# Patient Record
Sex: FEMALE | Race: WHITE | HISPANIC OR LATINO | Employment: STUDENT | URBAN - METROPOLITAN AREA
[De-identification: names, ages, dates, MRNs, and addresses within clinical notes are randomized per-mention and may not be internally consistent; named-entity substitution may affect disease eponyms.]

---
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
## 2024-06-23 ENCOUNTER — NURSE TRIAGE (OUTPATIENT)
Dept: ADMINISTRATIVE | Facility: CLINIC | Age: 19
End: 2024-06-23
Payer: MEDICAID

## 2024-06-23 ENCOUNTER — HOSPITAL ENCOUNTER (EMERGENCY)
Facility: HOSPITAL | Age: 19
Discharge: HOME | End: 2024-06-23
Attending: EMERGENCY MEDICINE
Payer: MEDICAID

## 2024-06-23 VITALS
WEIGHT: 116.38 LBS | BODY MASS INDEX: 19.37 KG/M2 | RESPIRATION RATE: 16 BRPM | HEART RATE: 101 BPM | DIASTOLIC BLOOD PRESSURE: 87 MMHG | TEMPERATURE: 98 F | SYSTOLIC BLOOD PRESSURE: 139 MMHG | OXYGEN SATURATION: 98 %

## 2024-06-23 DIAGNOSIS — L25.9 CONTACT DERMATITIS, UNSPECIFIED CONTACT DERMATITIS TYPE, UNSPECIFIED TRIGGER: Primary | ICD-10-CM

## 2024-06-23 PROCEDURE — 99281 EMR DPT VST MAYX REQ PHY/QHP: CPT

## 2024-06-23 NOTE — Clinical Note
"Torri Harrissiobhan Dunn was seen and treated in our emergency department on 6/23/2024.  She may return to school on 06/25/2024.      If you have any questions or concerns, please don't hesitate to call.      Hugo Hoff, NP"

## 2024-06-23 NOTE — ED PROVIDER NOTES
Encounter Date: 6/23/2024       History     Chief Complaint   Patient presents with    Breast Problem     Reports R areola pain and infection, seen at  and given abx and cream x 3 days ago. Reports infection is spreading to other parts of breast tissue.      18-year-old female with complaint of redness around her right breast over the past couple of days.  Patient had infection around nipple near nipple ring and patient was placed on antibiotics a few days ago.  Patient reports she is noticed redness where the bandage is currently in place.  Patient denies pain at nipple.  Patient is just concerned about the redness.        Review of patient's allergies indicates:  No Known Allergies  No past medical history on file.  Past Surgical History:   Procedure Laterality Date    tonsilectomy  2012     Family History   Problem Relation Name Age of Onset    Diabetes Father      Hypertension Father      Cancer Maternal Grandfather          Review of Systems   Constitutional:  Negative for fever.   HENT:  Negative for sore throat.    Respiratory:  Negative for shortness of breath.    Cardiovascular:  Negative for chest pain.   Gastrointestinal:  Negative for nausea.   Genitourinary:  Negative for dysuria.   Musculoskeletal:  Negative for back pain.   Skin:  Negative for rash.   Neurological:  Negative for weakness.   Hematological:  Does not bruise/bleed easily.       Physical Exam     Initial Vitals [06/23/24 1820]   BP Pulse Resp Temp SpO2   139/87 101 16 98.1 °F (36.7 °C) 98 %      MAP       --         Physical Exam    Nursing note and vitals reviewed.  Constitutional: She appears well-developed and well-nourished.   HENT:   Head: Normocephalic and atraumatic.   Eyes: Conjunctivae and EOM are normal. Pupils are equal, round, and reactive to light.   Neck: Neck supple.   Normal range of motion.  Cardiovascular:  Normal rate, regular rhythm, normal heart sounds and intact distal pulses.           Pulmonary/Chest: Breath  sounds normal.   No breast swelling, no erythema, no nipple swelling, no nipple tenderness, well demarcated mildly erythematous patches in line of where adhesive from tape was resting   Abdominal: Abdomen is soft. There is no abdominal tenderness. There is no rebound and no guarding.   Musculoskeletal:         General: Normal range of motion.      Cervical back: Normal range of motion and neck supple.     Neurological: She is alert and oriented to person, place, and time. She has normal strength and normal reflexes.   Skin: Skin is warm and dry.   Psychiatric: She has a normal mood and affect. Her behavior is normal. Thought content normal.         ED Course   Procedures  Labs Reviewed - No data to display       Imaging Results    None          Medications - No data to display  Medical Decision Making                                    Clinical Impression:  Final diagnoses:  [L25.9] Contact dermatitis, unspecified contact dermatitis type, unspecified trigger (Primary)          ED Disposition Condition    Discharge Stable          ED Prescriptions    None       Follow-up Information       Follow up With Specialties Details Why Contact Info    PCP  Schedule an appointment as soon as possible for a visit in 2 days               Hugo Hoff NP  06/23/24 [id number]

## 2024-06-23 NOTE — TELEPHONE ENCOUNTER
Spoke with pt who states that she got nipple pierced, and reports infection to right nipple. States was seen at an aftercare, and was given antibiotics. States that redness is beginning to spread reports breast feels warm as well. Advised to be seen by provider within 24 hours. ED/UC. Advised. Also advised can make appointment with PCP. Verbalized understanding.    Reason for Disposition   [1] Breast looks infected (spreading redness, feels hot or painful to touch) AND [2] no fever    Additional Information   Negative: [1] SEVERE breast pain AND [2] fever > 103 F (39.4 C)   Negative: Patient sounds very sick or weak to the triager   Negative: [1] Breast looks infected (spreading redness, feels hot or painful to touch) AND [2] fever    Protocols used: Breast Symptoms-A-AH     Birth Control Pills Pregnancy And Lactation Text: This medication should be avoided if pregnant and for the first 30 days post-partum.